# Patient Record
Sex: MALE | Race: WHITE | NOT HISPANIC OR LATINO | Employment: FULL TIME | URBAN - METROPOLITAN AREA
[De-identification: names, ages, dates, MRNs, and addresses within clinical notes are randomized per-mention and may not be internally consistent; named-entity substitution may affect disease eponyms.]

---

## 2017-01-30 ENCOUNTER — ALLSCRIPTS OFFICE VISIT (OUTPATIENT)
Dept: OTHER | Facility: OTHER | Age: 32
End: 2017-01-30

## 2017-04-06 ENCOUNTER — ALLSCRIPTS OFFICE VISIT (OUTPATIENT)
Dept: OTHER | Facility: OTHER | Age: 32
End: 2017-04-06

## 2018-01-14 VITALS
TEMPERATURE: 97.7 F | DIASTOLIC BLOOD PRESSURE: 72 MMHG | WEIGHT: 226 LBS | BODY MASS INDEX: 29.95 KG/M2 | HEART RATE: 72 BPM | RESPIRATION RATE: 16 BRPM | SYSTOLIC BLOOD PRESSURE: 120 MMHG | HEIGHT: 73 IN

## 2018-01-14 VITALS
HEIGHT: 73 IN | DIASTOLIC BLOOD PRESSURE: 72 MMHG | SYSTOLIC BLOOD PRESSURE: 120 MMHG | HEART RATE: 74 BPM | WEIGHT: 220 LBS | RESPIRATION RATE: 16 BRPM | BODY MASS INDEX: 29.16 KG/M2 | TEMPERATURE: 97.7 F

## 2018-12-12 ENCOUNTER — OFFICE VISIT (OUTPATIENT)
Dept: URGENT CARE | Facility: CLINIC | Age: 33
End: 2018-12-12
Payer: COMMERCIAL

## 2018-12-12 VITALS
HEIGHT: 72 IN | OXYGEN SATURATION: 100 % | TEMPERATURE: 97.8 F | RESPIRATION RATE: 16 BRPM | SYSTOLIC BLOOD PRESSURE: 120 MMHG | HEART RATE: 82 BPM | DIASTOLIC BLOOD PRESSURE: 80 MMHG | BODY MASS INDEX: 29.91 KG/M2 | WEIGHT: 220.8 LBS

## 2018-12-12 DIAGNOSIS — B35.6 TINEA CRURIS: Primary | ICD-10-CM

## 2018-12-12 PROCEDURE — 99213 OFFICE O/P EST LOW 20 MIN: CPT | Performed by: PHYSICIAN ASSISTANT

## 2018-12-12 RX ORDER — TERBINAFINE HYDROCHLORIDE 250 MG/1
250 TABLET ORAL DAILY
Qty: 14 TABLET | Refills: 0 | Status: SHIPPED | OUTPATIENT
Start: 2018-12-12 | End: 2018-12-26

## 2018-12-12 RX ORDER — ESOMEPRAZOLE MAGNESIUM 40 MG/1
CAPSULE, DELAYED RELEASE ORAL
COMMUNITY
End: 2022-05-10 | Stop reason: ALTCHOICE

## 2018-12-12 RX ORDER — TERBINAFINE HYDROCHLORIDE 250 MG/1
250 TABLET ORAL DAILY
Qty: 7 TABLET | Refills: 0 | Status: SHIPPED | OUTPATIENT
Start: 2018-12-12 | End: 2018-12-12 | Stop reason: SDUPTHER

## 2018-12-12 NOTE — PATIENT INSTRUCTIONS
Jock Itch   WHAT YOU NEED TO KNOW:   Jock itch is a rash on your groin  The groin is the area between your abdomen and legs  Jock itch is usually easy to treat and prevent  It is caused by a fungus  The fungus also causes athlete's foot  DISCHARGE INSTRUCTIONS:   Medicines:   · Fungus medicine:  Jock itch is usually treated with a cream that kills the fungus  Apply the cream to the rash and the skin around it as directed  You may need to apply the cream 1 to 2 times each day for 2 weeks  You may be given this medicine as a pill if the cream does not help  · Take your medicine as directed  Contact your healthcare provider if you think your medicine is not helping or if you have side effects  Tell him or her if you are allergic to any medicine  Keep a list of the medicines, vitamins, and herbs you take  Include the amounts, and when and why you take them  Bring the list or the pill bottles to follow-up visits  Carry your medicine list with you in case of an emergency  Manage and prevent jock itch:   · Keep the area dry  · Wear light, loose clothes  Do not share clothes  · Do not wear wet clothes for long periods  Wash athletic gear after you play sports  · Bathe daily  Dry your skin completely after you bathe  Apply cream or powder after you bathe as directed if you get jock itch often  Wash your hands often to prevent the spread of the fungus  You may want to wear disposable gloves when you clean your feet  The gloves will keep the fungus from moving from your feet to your hands  · Use separate towels to dry each part of your body  Put your socks on before you put on your underwear so you do not spread the fungus from your feet to your groin  · Lose weight if you weigh more than your healthcare provider suggests  Follow up with your healthcare provider or skin specialist as directed: Your healthcare provider will examine your rash to see how well it is healing   If you take medicine as a pill, he may draw blood to check how your liver and kidneys are working  Write down your questions so you remember to ask them during your visits  Contact your healthcare provider or skin specialist if:   · Your signs and symptoms do not get better within 2 weeks of treatment  · Your signs and symptoms get worse or come back after treatment  · You get a rash on a part of your body other than your groin  · You have a fever  · You have questions or concerns about your condition or care  © 2017 [facility] [facility] Information is for End User's use only and may not be sold, redistributed or otherwise used for commercial purposes  All illustrations and images included in CareNotes® are the copyrighted property of A D A M , Inc  or Harris Hinojosa  The above information is an  only  It is not intended as medical advice for individual conditions or treatments  Talk to your doctor, nurse or pharmacist before following any medical regimen to see if it is safe and effective for you

## 2018-12-12 NOTE — PROGRESS NOTES
[id number]        NAME: Sebastian Wick is a 28 y o  male  : 1985    MRN: [id number]  DATE: 2018  TIME: 11:36 AM    Assessment and Plan   Tinea cruris [B35 6]  1  Tinea cruris  nystatin-triamcinolone (MYCOLOG-II) cream    terbinafine (LamISIL) 250 mg tablet    DISCONTINUED: terbinafine (LamISIL) 250 mg tablet         Patient Instructions     Discussed condition with pt  He has persistent tinea cruris rash  I will treat him with combination of Lamisil 250 mg tablets and Mycolog cream for the next two weeks and he can continue with Nystatin powder  He needs to keep the affected area dry  If no significant improvement in 2 weeks, then he will need to be reevaluated  Follow up with PCP in 3-5 days  Proceed to  ER if symptoms worsen  Chief Complaint     Chief Complaint   Patient presents with    Rash      fungal infection "jock itch" was seen last week at [facility] treated with an anti fungal powder which he feels made it worse  Has been symptomatic since August         History of Present Illness       Pt presents with a chronic rash in his groin/inguinal region which he has had for the past 3-4 months  He was seen at [place] [address]  and diagnosed with jock itch and was prescribed Nystatin powder which he has used without success and the rash persists  He reports it is itchy  Denies any other associated symptoms or any other distribution of the rash  Review of Systems   Review of Systems   Constitutional: Negative  Respiratory: Negative  Cardiovascular: Negative  Gastrointestinal: Negative  Genitourinary: Negative  Skin: Positive for rash (Groin/inguinal region x 3-4 months)           Current Medications       Current Outpatient Prescriptions:     esomeprazole (NEXIUM) 40 MG capsule, Take by mouth, Disp: , Rfl:     nystatin-triamcinolone (MYCOLOG-II) cream, Apply topically 2 (two) times a day, Disp: 60 g, Rfl: 0    terbinafine (LamISIL) 250 mg tablet, Take 1 tablet (250 mg total) by mouth daily for 14 days, Disp: 14 tablet, Rfl: 0    Current Allergies     Allergies as of 12/12/2018    (No Known Allergies)            The following portions of the patient's history were reviewed and updated as appropriate: allergies, current medications, past family history, past medical history, past social history, past surgical history and problem list      Past Medical History:   Diagnosis Date    GERD (gastroesophageal reflux disease)        Past Surgical History:   Procedure Laterality Date    NASAL SEPTUM SURGERY         Family History   Problem Relation Age of Onset    No Known Problems Mother     No Known Problems Father          Medications have been verified  Objective   /80   Pulse 82   Temp 97 8 °F (36 6 °C)   Resp 16   Ht 6' (1 829 m)   Wt 100 kg (220 lb 12 8 oz)   SpO2 100%   BMI 29 95 kg/m²        Physical Exam     Physical Exam   Constitutional: He is oriented to person, place, and time  He appears well-developed and well-nourished  No distress  Neurological: He is alert and oriented to person, place, and time  Skin: Rash (Groin/inguinal region and scrotum with localized patchy scaly erythematous macular rash resembling tinea) noted  Psychiatric: He has a normal mood and affect  Vitals reviewed

## 2019-02-26 ENCOUNTER — OFFICE VISIT (OUTPATIENT)
Dept: FAMILY MEDICINE CLINIC | Facility: CLINIC | Age: 34
End: 2019-02-26
Payer: COMMERCIAL

## 2019-02-26 VITALS
WEIGHT: 224 LBS | TEMPERATURE: 97.9 F | RESPIRATION RATE: 16 BRPM | OXYGEN SATURATION: 96 % | SYSTOLIC BLOOD PRESSURE: 124 MMHG | BODY MASS INDEX: 30.34 KG/M2 | DIASTOLIC BLOOD PRESSURE: 80 MMHG | HEIGHT: 72 IN | HEART RATE: 92 BPM

## 2019-02-26 DIAGNOSIS — K44.9 HIATAL HERNIA: ICD-10-CM

## 2019-02-26 DIAGNOSIS — F17.200 CURRENTLY SMOKES TOBACCO: ICD-10-CM

## 2019-02-26 DIAGNOSIS — J01.10 ACUTE NON-RECURRENT FRONTAL SINUSITIS: Primary | ICD-10-CM

## 2019-02-26 PROCEDURE — 3008F BODY MASS INDEX DOCD: CPT | Performed by: PHYSICIAN ASSISTANT

## 2019-02-26 PROCEDURE — 4004F PT TOBACCO SCREEN RCVD TLK: CPT | Performed by: PHYSICIAN ASSISTANT

## 2019-02-26 PROCEDURE — 99214 OFFICE O/P EST MOD 30 MIN: CPT | Performed by: PHYSICIAN ASSISTANT

## 2019-02-26 RX ORDER — AMOXICILLIN 500 MG/1
500 CAPSULE ORAL EVERY 12 HOURS SCHEDULED
Qty: 14 CAPSULE | Refills: 0 | Status: SHIPPED | OUTPATIENT
Start: 2019-02-26 | End: 2019-03-05

## 2019-02-26 NOTE — PROGRESS NOTES
Assessment/Plan:    Currently smokes tobacco  Pt is in the Precontemplation stage (ie, not wanting to quit)  - Educate on the negative effects of Tobacco   - Recommend quitting smoking  - Listed cessation options     Acute frontal sinusitis  - Recommended saline nasal spray and intranasal steroid spray  - Pt will continue with OTC supportive medications  - Amoxicillin for 7 days due to having started the medications  - Directed pt to return if fever over 101, visual changes, worsening headache  Hiatal hernia  Previously managed and observed by GI  - Continue with Nexium daily  - Referral to GI   Recommended discussion for Nissen fudiplication with surgeon           Discussed case with Dr Laveda Schlatter    Subjective:    Patient ID: Michelle Ibarra is a 28 y o  male  Pt is presenting today for Nasal pressure, runny nose, coughing, mild sore throat for for 4 days  His sinus pressure, runny nose and cough have all improved since starting Amoxicillin    He has been taking Amoxicillin 500 mg BID for 4 days  He is out and requesting to complete his 7 day course  Patient smokes cigarettes  He is not wanting to quit  He takes nexium daily  He was diagnosed with hiatal hernia and has symptoms if he does not take PPIs daily  Sinusitis   There has been no fever  The following portions of the patient's history were reviewed and updated as appropriate: allergies, current medications, past social history and problem list     Review of Systems      Objective:  /80 (BP Location: Left arm, Patient Position: Sitting, Cuff Size: Adult)   Pulse 92   Temp 97 9 °F (36 6 °C)   Resp 16   Ht 6' (1 829 m)   Wt 102 kg (224 lb)   SpO2 96%   BMI 30 38 kg/m²      Physical Exam   Constitutional: He is oriented to person, place, and time  He appears well-developed and well-nourished  HENT:   Head: Normocephalic and atraumatic     Right Ear: Tympanic membrane, external ear and ear canal normal    Left Ear: Tympanic membrane, external ear and ear canal normal    Nose: Nose normal  No rhinorrhea  Mouth/Throat: Oropharynx is clear and moist  No oropharyngeal exudate or posterior oropharyngeal erythema  Cardiovascular: Normal rate, regular rhythm and normal heart sounds  Exam reveals no gallop and no friction rub  No murmur heard  Pulmonary/Chest: Effort normal and breath sounds normal  He has no wheezes  He has no rales  Lymphadenopathy:        Head (right side): No submental, no submandibular, no tonsillar, no preauricular and no posterior auricular adenopathy present  Head (left side): No submental, no submandibular, no tonsillar, no preauricular and no posterior auricular adenopathy present  He has no cervical adenopathy  Neurological: He is alert and oriented to person, place, and time  Skin: Skin is warm and dry  Psychiatric: He has a normal mood and affect  His behavior is normal  Thought content normal    Vitals reviewed

## 2019-02-26 NOTE — ASSESSMENT & PLAN NOTE
Pt is in the Precontemplation stage (ie, not wanting to quit)  - Educate on the negative effects of Tobacco   - Recommend quitting smoking  - Listed cessation options

## 2019-02-26 NOTE — ASSESSMENT & PLAN NOTE
- Recommended saline nasal spray and intranasal steroid spray  - Pt will continue with OTC supportive medications  - Amoxicillin for 7 days due to having started the medications  - Directed pt to return if fever over 101, visual changes, worsening headache

## 2019-02-27 NOTE — ASSESSMENT & PLAN NOTE
Previously managed and observed by GI  - Continue with Nexium daily  - Referral to GI   Recommended discussion for Nissen fudiplication with surgeon

## 2019-09-24 ENCOUNTER — OFFICE VISIT (OUTPATIENT)
Dept: FAMILY MEDICINE CLINIC | Facility: CLINIC | Age: 34
End: 2019-09-24
Payer: COMMERCIAL

## 2019-09-24 VITALS
HEIGHT: 72 IN | HEART RATE: 94 BPM | BODY MASS INDEX: 30.94 KG/M2 | OXYGEN SATURATION: 97 % | RESPIRATION RATE: 18 BRPM | TEMPERATURE: 98.2 F | WEIGHT: 228.4 LBS | SYSTOLIC BLOOD PRESSURE: 132 MMHG | DIASTOLIC BLOOD PRESSURE: 84 MMHG

## 2019-09-24 DIAGNOSIS — J00 ACUTE NASOPHARYNGITIS: ICD-10-CM

## 2019-09-24 DIAGNOSIS — N52.9 ERECTILE DYSFUNCTION, UNSPECIFIED ERECTILE DYSFUNCTION TYPE: Primary | ICD-10-CM

## 2019-09-24 PROBLEM — J01.10 ACUTE FRONTAL SINUSITIS: Status: RESOLVED | Noted: 2017-01-30 | Resolved: 2019-09-24

## 2019-09-24 PROCEDURE — 99213 OFFICE O/P EST LOW 20 MIN: CPT | Performed by: PHYSICIAN ASSISTANT

## 2019-09-24 PROCEDURE — 3008F BODY MASS INDEX DOCD: CPT | Performed by: PHYSICIAN ASSISTANT

## 2019-09-24 NOTE — PROGRESS NOTES
Assessment/Plan:     Diagnoses and all orders for this visit:    Acute nasopharyngitis  Discussed likely viral etiology  Patient was requesting antibiotics  Patient had a difficult time grasping that antibiotics would not help with viral infections  - CENTOR score 0/5  - advised to continue OTC supportive care with Tylenol/Motrin, Mucinex and saline gargle   - encouraged rest and fluid intake   - educated Pt that cough can take 10-14 days total to resolve  - directed to return if fever over 102, symptoms persist for 14 days, thick productive cough    Erectile dysfunction, unspecified erectile dysfunction type  Patient has previously been worked up by Urology and has had PSA last year which was normal   Patient is requesting specific medication for erectile dysfunction for which he does not know the name  Patient has failed treatment with oxybutynin and Trospium  Discussed possibility vascular or psychosomatic etiology  Patient did not denies any psychiatric illnesses, anxiety or depression  - based on this patient's request for specific medications provided order for urology follow-up  -     Ambulatory referral to Urology; Future                Subjective:    Patient ID: Karoline Mayes is a 28 y o  male  Pt is presenting today for Sore throat for 7 days  Sore throat improved with Aleve  Denies any sick contacts  Patient has been having issues with erectile dysfunction for the past several years  He has seen Urology in the past   He had a PSA which was normal last year  He has tried multiple medications but has not found 1 he is looking for  Patient would not like to try Viagra  He is looking for a pill that he can take oncethat will fix his erectile dysfunction  URI    There has been no fever  Associated symptoms include coughing (slight) and a sore throat  Pertinent negatives include no diarrhea, dysuria, ear pain, nausea, plugged ear sensation, rhinorrhea, sinus pain or vomiting   He has tried NSAIDs for the symptoms  The following portions of the patient's history were reviewed and updated as appropriate: allergies, current medications and problem list     Review of Systems   HENT: Positive for sore throat  Negative for ear pain, rhinorrhea and sinus pain  Respiratory: Positive for cough (slight)  Gastrointestinal: Negative for diarrhea, nausea and vomiting  Genitourinary: Negative for dysuria  Objective:  /84 (BP Location: Left arm, Patient Position: Sitting, Cuff Size: Large)   Pulse 94   Temp 98 2 °F (36 8 °C)   Resp 18   Ht 6' (1 829 m)   Wt 104 kg (228 lb 6 4 oz)   SpO2 97%   BMI 30 98 kg/m²      Physical Exam   Constitutional: He is oriented to person, place, and time  He appears well-developed and well-nourished  HENT:   Head: Normocephalic and atraumatic  Right Ear: Tympanic membrane, external ear and ear canal normal    Left Ear: Tympanic membrane, external ear and ear canal normal    Nose: Nose normal  No rhinorrhea  Mouth/Throat: Posterior oropharyngeal erythema present  No oropharyngeal exudate  No tonsillar exudate  Cardiovascular: Normal rate, regular rhythm and normal heart sounds  Exam reveals no gallop and no friction rub  No murmur heard  Pulmonary/Chest: Effort normal and breath sounds normal  He has no wheezes  He has no rales  Lymphadenopathy:        Head (right side): No submental, no submandibular, no tonsillar, no preauricular and no posterior auricular adenopathy present  Head (left side): No submental, no submandibular, no tonsillar, no preauricular and no posterior auricular adenopathy present  He has no cervical adenopathy  Neurological: He is alert and oriented to person, place, and time  Skin: Skin is warm and dry  Psychiatric: He has a normal mood and affect  His behavior is normal  Thought content normal    Vitals reviewed

## 2020-01-31 ENCOUNTER — OFFICE VISIT (OUTPATIENT)
Dept: FAMILY MEDICINE CLINIC | Facility: CLINIC | Age: 35
End: 2020-01-31

## 2020-01-31 VITALS
HEIGHT: 72 IN | OXYGEN SATURATION: 98 % | HEART RATE: 81 BPM | DIASTOLIC BLOOD PRESSURE: 80 MMHG | SYSTOLIC BLOOD PRESSURE: 122 MMHG | TEMPERATURE: 97.4 F | WEIGHT: 233 LBS | BODY MASS INDEX: 31.56 KG/M2

## 2020-01-31 DIAGNOSIS — F17.200 CURRENTLY SMOKES TOBACCO: ICD-10-CM

## 2020-01-31 DIAGNOSIS — J18.9 PNEUMONIA OF RIGHT LUNG DUE TO INFECTIOUS ORGANISM, UNSPECIFIED PART OF LUNG: Primary | ICD-10-CM

## 2020-01-31 PROCEDURE — 3008F BODY MASS INDEX DOCD: CPT | Performed by: FAMILY MEDICINE

## 2020-01-31 PROCEDURE — 99213 OFFICE O/P EST LOW 20 MIN: CPT | Performed by: FAMILY MEDICINE

## 2020-01-31 RX ORDER — AZITHROMYCIN 250 MG/1
TABLET, FILM COATED ORAL
Qty: 6 TABLET | Refills: 0 | Status: SHIPPED | OUTPATIENT
Start: 2020-01-31 | End: 2020-02-05

## 2020-01-31 NOTE — PROGRESS NOTES
Assessment/Plan:   Diagnoses and all orders for this visit:    Pneumonia of right lung due to infectious organism, unspecified part of lung  -     X-ray chest 2 views; Future  -     azithromycin (ZITHROMAX) 250 mg tablet; Take 2 tablets (500 mg total) by mouth daily for 1 day, THEN 1 tablet (250 mg total) daily for 4 days  - reviewed  records - no imaging done as pt declined   - STRONGLY advised pt to get a CXR as his s/s have persisted despite being on Doxy 100mg BID   - (+) strong h/o smoking - 1/2PPD x20yrs   - overdue for screening labs and annual exam - pt due to get insurance in June 2020 and will RTO then for routine screening   Currently smokes tobacco          Subjective:    Patient ID: Krystyna De La Torre is a 27 y o  male  30yo M presents to the office for eval of cough   - was seen at [facility] on 1/7/2020 and treated with Doxy 100mg BID x 10days for presumed pneumonia  - completed course of treatment, felt better and s/s restarted today   - (+) back pain - mid-scapular pain on the R-side   - denies F/night sweats/N/Vearache/HA/sinus pressure/CP/palpitations/wheezing/abd pain/D/weight loss  - (+) chills, SOB, cough productive for mucus with faint streaks of blood (was a lot worse when it 1st started)   - (+) current everyday smoker - 1/2PPD for 20yrs   - declined CXR at [facility] as does not have medical insurance      The following portions of the patient's history were reviewed and updated as appropriate: allergies, current medications, past family history, past medical history, past social history, past surgical history and problem list     Review of Systems  as per HPI    Objective:  /80 (BP Location: Left arm, Patient Position: Sitting, Cuff Size: Large)   Pulse 81   Temp (!) 97 4 °F (36 3 °C)   Ht 6' (1 829 m)   Wt 106 kg (233 lb)   SpO2 98%   BMI 31 60 kg/m²    Physical Exam   Constitutional: He is oriented to person, place, and time  He appears well-developed and well-nourished  No distress     HENT: Head: Normocephalic and atraumatic  Right Ear: Tympanic membrane, external ear and ear canal normal  No drainage, swelling or tenderness  No middle ear effusion  Left Ear: Tympanic membrane, external ear and ear canal normal  No drainage, swelling or tenderness  No middle ear effusion  Nose: Mucosal edema present  No rhinorrhea  Right sinus exhibits no maxillary sinus tenderness and no frontal sinus tenderness  Left sinus exhibits no maxillary sinus tenderness and no frontal sinus tenderness  Mouth/Throat: Uvula is midline, oropharynx is clear and moist and mucous membranes are normal  No oral lesions  No uvula swelling  No oropharyngeal exudate, posterior oropharyngeal edema, posterior oropharyngeal erythema or tonsillar abscesses  (+) PND   Eyes: Conjunctivae and EOM are normal  Right eye exhibits no discharge  Left eye exhibits no discharge  No scleral icterus  Neck: Normal range of motion  No tracheal deviation present  Cardiovascular: Normal rate, regular rhythm and normal heart sounds  Exam reveals no gallop and no friction rub  No murmur heard  Pulmonary/Chest: Effort normal  He has decreased breath sounds in the right middle field  He exhibits tenderness  Mid-thoracic tenderness on R-side    Abdominal: Soft  Bowel sounds are normal    Musculoskeletal: Normal range of motion  Lymphadenopathy:     He has no cervical adenopathy  Neurological: He is alert and oriented to person, place, and time  Skin: Skin is warm  He is not diaphoretic  Psychiatric: He has a normal mood and affect  His behavior is normal    Vitals reviewed

## 2020-02-03 ENCOUNTER — TELEPHONE (OUTPATIENT)
Dept: FAMILY MEDICINE CLINIC | Facility: CLINIC | Age: 35
End: 2020-02-03

## 2020-02-04 NOTE — TELEPHONE ENCOUNTER
Wanted to let Dr Esperanza Zamora know antibiotics are defiantly helping, he is feeling a lot better   Was looking up online symptoms and saw something about TB having the same symptoms he has so he was wondering if he should be tested for TB

## 2020-02-05 ENCOUNTER — APPOINTMENT (OUTPATIENT)
Dept: RADIOLOGY | Facility: CLINIC | Age: 35
End: 2020-02-05
Payer: COMMERCIAL

## 2020-02-05 DIAGNOSIS — J18.9 PNEUMONIA OF RIGHT LUNG DUE TO INFECTIOUS ORGANISM, UNSPECIFIED PART OF LUNG: ICD-10-CM

## 2020-02-05 PROCEDURE — 71046 X-RAY EXAM CHEST 2 VIEWS: CPT

## 2020-04-03 ENCOUNTER — TELEMEDICINE (OUTPATIENT)
Dept: FAMILY MEDICINE CLINIC | Facility: CLINIC | Age: 35
End: 2020-04-03
Payer: COMMERCIAL

## 2020-04-03 DIAGNOSIS — Z20.828 EXPOSURE TO SARS-ASSOCIATED CORONAVIRUS: Primary | ICD-10-CM

## 2020-04-03 PROCEDURE — G2012 BRIEF CHECK IN BY MD/QHP: HCPCS | Performed by: FAMILY MEDICINE

## 2020-04-05 ENCOUNTER — TELEMEDICINE (OUTPATIENT)
Dept: FAMILY MEDICINE CLINIC | Facility: CLINIC | Age: 35
End: 2020-04-05
Payer: COMMERCIAL

## 2020-04-05 VITALS — TEMPERATURE: 98.6 F

## 2020-04-05 DIAGNOSIS — Z20.828 EXPOSURE TO SARS-ASSOCIATED CORONAVIRUS: Primary | ICD-10-CM

## 2020-04-05 PROCEDURE — G2012 BRIEF CHECK IN BY MD/QHP: HCPCS | Performed by: FAMILY MEDICINE

## 2020-04-06 DIAGNOSIS — Z20.828 EXPOSURE TO SARS-ASSOCIATED CORONAVIRUS: ICD-10-CM

## 2020-04-06 PROCEDURE — 87635 SARS-COV-2 COVID-19 AMP PRB: CPT

## 2020-04-07 LAB — SARS-COV-2 RNA SPEC QL NAA+PROBE: NOT DETECTED

## 2020-04-08 ENCOUNTER — TELEMEDICINE (OUTPATIENT)
Dept: FAMILY MEDICINE CLINIC | Facility: CLINIC | Age: 35
End: 2020-04-08
Payer: COMMERCIAL

## 2020-04-08 VITALS — TEMPERATURE: 98.3 F

## 2020-04-08 DIAGNOSIS — Z20.828 EXPOSURE TO SARS-ASSOCIATED CORONAVIRUS: Primary | ICD-10-CM

## 2020-04-08 DIAGNOSIS — J30.2 SEASONAL ALLERGIES: ICD-10-CM

## 2020-04-08 PROCEDURE — 99212 OFFICE O/P EST SF 10 MIN: CPT | Performed by: FAMILY MEDICINE

## 2021-02-11 ENCOUNTER — TELEMEDICINE (OUTPATIENT)
Dept: FAMILY MEDICINE CLINIC | Facility: CLINIC | Age: 36
End: 2021-02-11

## 2021-02-11 VITALS — BODY MASS INDEX: 31.15 KG/M2 | TEMPERATURE: 97 F | WEIGHT: 230 LBS | HEIGHT: 72 IN

## 2021-02-11 DIAGNOSIS — R39.15 URINARY URGENCY: ICD-10-CM

## 2021-02-11 DIAGNOSIS — B08.5 APHTHOUS PHARYNGITIS: Primary | ICD-10-CM

## 2021-02-11 PROCEDURE — 99214 OFFICE O/P EST MOD 30 MIN: CPT | Performed by: FAMILY MEDICINE

## 2021-02-11 RX ORDER — TRIAMCINOLONE ACETONIDE 0.1 %
1 PASTE (GRAM) DENTAL 3 TIMES DAILY
Qty: 5 G | Refills: 0 | Status: SHIPPED | OUTPATIENT
Start: 2021-02-11 | End: 2022-05-10 | Stop reason: ALTCHOICE

## 2021-02-11 RX ORDER — SULFAMETHOXAZOLE AND TRIMETHOPRIM 800; 160 MG/1; MG/1
1 TABLET ORAL 2 TIMES DAILY
Qty: 6 TABLET | Refills: 0 | Status: SHIPPED | OUTPATIENT
Start: 2021-02-11 | End: 2021-02-14

## 2021-02-11 NOTE — PROGRESS NOTES
Virtual Regular Visit      Assessment/Plan:    Problem List Items Addressed This Visit        Digestive    Aphthous pharyngitis - Primary     Patient has a very large aphthous ulcer and some erythema at the posterior pharynx  Doubtful for any COVID-19 infection    -patient given a prescription for Kenalog in oral base to apply to area t i d   Advised patient to use Cepacol lozenges and Chloraseptic spray as needed for throat discomfort         Relevant Medications    triamcinolone (KENALOG) 0 1 % oral topical paste       Other    Urinary urgency     Patient has seen Urology in the past as recently as November    -patient is not presently in the office to provide urinalysis  Will place the patient on 3 day course of Bactrim DS    -advised patient that if his symptoms are not improving or if they do not completely resolve then he will need to contact the office as he will need formal evaluation and urinalysis         Relevant Medications    sulfamethoxazole-trimethoprim (BACTRIM DS) 800-160 mg per tablet               Reason for visit is   Chief Complaint   Patient presents with    COVID-19    Cold Like Symptoms    Virtual Regular Visit        Encounter provider Debi Kaur DO    Provider located at [address] [place] [id number]      Recent Visits  No visits were found meeting these conditions  Showing recent visits within past 7 days and meeting all other requirements     Today's Visits  Date Type Provider Dept   02/11/21 Telemedicine Debi Kaur DO [facility]   Showing today's visits and meeting all other requirements     Future Appointments  No visits were found meeting these conditions  Showing future appointments within next 150 days and meeting all other requirements        The patient was identified by name and date of birth   Cesar Davies was informed that this is a telemedicine visit and that the visit is being conducted through [facility] and patient was informed that this is a secure, HIPAA-compliant platform  He agrees to proceed     My office door was closed  No one else was in the room  He acknowledged consent and understanding of privacy and security of the video platform  The patient has agreed to participate and understands they can discontinue the visit at any time  Patient is aware this is a billable service  Subjective  Yogesh Odonnell is a 38 y o  male    40-year-old male presents complaining of slight sore throat that seems to radiate to his ears  No fevers or chills  No nasal congestion or respiratory symptoms  Patient states that sore throat has been present for over 1 week  Patient also complains urinary frequency  No flank pain  Patient does have prior history of urinary frequency and urinary tract infections (?)       Past Medical History:   Diagnosis Date    GERD (gastroesophageal reflux disease)        Past Surgical History:   Procedure Laterality Date    NASAL SEPTUM SURGERY         Current Outpatient Medications   Medication Sig Dispense Refill    esomeprazole (NEXIUM) 40 MG capsule Take by mouth      sulfamethoxazole-trimethoprim (BACTRIM DS) 800-160 mg per tablet Take 1 tablet by mouth 2 (two) times a day for 3 days 6 tablet 0    triamcinolone (KENALOG) 0 1 % oral topical paste Apply 1 application topically 3 (three) times a day 5 g 0     No current facility-administered medications for this visit  No Known Allergies    Review of Systems   Constitutional: Negative  Negative for chills, fatigue and fever  HENT: Positive for sore throat  Negative for congestion, ear discharge, ear pain, facial swelling, hearing loss, rhinorrhea and trouble swallowing  Respiratory: Negative  Gastrointestinal: Negative  Genitourinary: Positive for frequency and urgency  Negative for difficulty urinating, dysuria, hematuria and penile pain         Video Exam    Vitals:    02/11/21 1356 Temp: (!) 97 °F (36 1 °C)   Weight: 104 kg (230 lb)   Height: 6' (1 829 m)       Physical Exam  Vitals signs and nursing note reviewed  Constitutional:       General: He is not in acute distress  Appearance: Normal appearance  He is well-developed and normal weight  He is not ill-appearing  HENT:      Head: Normocephalic and atraumatic  Mouth/Throat:      Mouth: Mucous membranes are moist       Pharynx: Posterior oropharyngeal erythema present  Comments: Large aphthous ulcer at the left tonsillar pillar  Eyes:      Extraocular Movements: Extraocular movements intact  Conjunctiva/sclera: Conjunctivae normal       Pupils: Pupils are equal, round, and reactive to light  Pulmonary:      Effort: Pulmonary effort is normal  No respiratory distress  Neurological:      General: No focal deficit present  Mental Status: He is alert and oriented to person, place, and time  Psychiatric:         Mood and Affect: Mood normal          Behavior: Behavior normal          Thought Content: Thought content normal          Judgment: Judgment normal           I spent 11 minutes directly with the patient during this visit      VIRTUAL VISIT DISCLAIMER    Nehal Denny acknowledges that he has consented to an online visit or consultation  He understands that the online visit is based solely on information provided by him, and that, in the absence of a face-to-face physical evaluation by the physician, the diagnosis he receives is both limited and provisional in terms of accuracy and completeness  This is not intended to replace a full medical face-to-face evaluation by the physician  Nehal Denny understands and accepts these terms

## 2021-02-11 NOTE — ASSESSMENT & PLAN NOTE
Patient has seen Urology in the past as recently as November    -patient is not presently in the office to provide urinalysis    Will place the patient on 3 day course of Bactrim DS    -advised patient that if his symptoms are not improving or if they do not completely resolve then he will need to contact the office as he will need formal evaluation and urinalysis

## 2021-02-11 NOTE — ASSESSMENT & PLAN NOTE
Patient has a very large aphthous ulcer and some erythema at the posterior pharynx    Doubtful for any COVID-19 infection    -patient given a prescription for Kenalog in oral base to apply to area t i d   Advised patient to use Cepacol lozenges and Chloraseptic spray as needed for throat discomfort

## 2022-05-10 ENCOUNTER — OFFICE VISIT (OUTPATIENT)
Dept: FAMILY MEDICINE CLINIC | Facility: CLINIC | Age: 37
End: 2022-05-10
Payer: COMMERCIAL

## 2022-05-10 VITALS
HEART RATE: 100 BPM | DIASTOLIC BLOOD PRESSURE: 78 MMHG | HEIGHT: 75 IN | OXYGEN SATURATION: 98 % | SYSTOLIC BLOOD PRESSURE: 120 MMHG | RESPIRATION RATE: 18 BRPM | WEIGHT: 230.4 LBS | BODY MASS INDEX: 28.65 KG/M2

## 2022-05-10 DIAGNOSIS — Z23 ENCOUNTER FOR IMMUNIZATION: ICD-10-CM

## 2022-05-10 DIAGNOSIS — Z13.6 SCREENING FOR CARDIOVASCULAR CONDITION: ICD-10-CM

## 2022-05-10 DIAGNOSIS — R35.0 URINE FREQUENCY: ICD-10-CM

## 2022-05-10 DIAGNOSIS — Z00.00 ANNUAL PHYSICAL EXAM: Primary | ICD-10-CM

## 2022-05-10 DIAGNOSIS — K44.9 HIATAL HERNIA: ICD-10-CM

## 2022-05-10 DIAGNOSIS — Z13.1 SCREENING FOR DIABETES MELLITUS: ICD-10-CM

## 2022-05-10 DIAGNOSIS — K21.9 GASTROESOPHAGEAL REFLUX DISEASE WITHOUT ESOPHAGITIS: ICD-10-CM

## 2022-05-10 PROBLEM — B08.5: Status: RESOLVED | Noted: 2021-02-11 | Resolved: 2022-05-10

## 2022-05-10 PROCEDURE — 99395 PREV VISIT EST AGE 18-39: CPT | Performed by: FAMILY MEDICINE

## 2022-05-10 PROCEDURE — 90715 TDAP VACCINE 7 YRS/> IM: CPT | Performed by: FAMILY MEDICINE

## 2022-05-10 PROCEDURE — 3725F SCREEN DEPRESSION PERFORMED: CPT | Performed by: FAMILY MEDICINE

## 2022-05-10 PROCEDURE — 90471 IMMUNIZATION ADMIN: CPT | Performed by: FAMILY MEDICINE

## 2022-05-10 NOTE — ASSESSMENT & PLAN NOTE
He takes over-the-counter Nexium 20 mg for almost 8-10 years, he says he had endoscopy in the past he does not want to go for endoscopy now he also has hiatal hernia, discussed about getting endoscopy as he has been on long-term Nexium he says he at this point does not want to go for another endoscopy

## 2022-05-10 NOTE — ASSESSMENT & PLAN NOTE
He has a long history of urine frequency and he says he was evaluated by urologist and there is no major abnormality he cannot hold the urine for long plate of time due to his bladder,

## 2022-05-10 NOTE — PATIENT INSTRUCTIONS

## 2022-05-10 NOTE — PROGRESS NOTES
ADULT ANNUAL [address]  [facility]    NAME: Marcin Members  AGE: 39 y o  SEX: male  : 1985     DATE: 5/10/2022     Assessment and Plan:     Problem List Items Addressed This Visit        Digestive    Esophageal reflux     He takes over-the-counter Nexium 20 mg for almost 8-10 years, he says he had endoscopy in the past he does not want to go for endoscopy now he also has hiatal hernia, discussed about getting endoscopy as he has been on long-term Nexium he says he at this point does not want to go for another endoscopy            Other    Hiatal hernia     He says his heart all hernia is not big and he has recommended by surgeon not to do any surgery         Urine frequency     He has a long history of urine frequency and he says he was evaluated by urologist and there is no major abnormality he cannot hold the urine for long plate of time due to his bladder,         Encounter for immunization - Primary    Relevant Orders    TDAP VACCINE GREATER THAN OR EQUAL TO 6YO IM (Completed)          Immunizations and preventive care screenings were discussed with patient today  Appropriate education was printed on patient's after visit summary  Counseling:  Alcohol/drug use: discussed moderation in alcohol intake, the recommendations for healthy alcohol use, and avoidance of illicit drug use  Dental Health: discussed importance of regular tooth brushing, flossing, and dental visits  Exercise: the importance of regular exercise/physical activity was discussed  Recommend exercise 3-5 times per week for at least 30 minutes  BMI Counseling: Body mass index is 28 8 kg/m²  The BMI is above normal  Nutrition recommendations include decreasing portion sizes, limiting drinks that contain sugar, moderation in carbohydrate intake and reducing intake of cholesterol  Exercise recommendations include exercising 3-5 times per week   Rationale for BMI follow-up plan is due to patient being overweight or obese  Depression Screening and Follow-up Plan: Patient was screened for depression during today's encounter  They screened negative with a PHQ-2 score of 0  Tobacco Cessation Counseling: Tobacco cessation counseling was provided  The patient is sincerely urged to quit consumption of tobacco  He is not ready to quit tobacco  Medication options not discussed  No follow-ups on file  Chief Complaint:     Chief Complaint   Patient presents with    Physical Exam      History of Present Illness:     Adult Annual Physical   Patient here for a comprehensive physical exam  The patient reports problems - gerd   takes 20 mg nexium otc, for many years , overall good health     Diet and Physical Activity  Diet/Nutrition: well balanced diet  Exercise: walking  Depression Screening  PHQ-2/9 Depression Screening    Little interest or pleasure in doing things: 0 - not at all  Feeling down, depressed, or hopeless: 0 - not at all  PHQ-2 Score: 0  PHQ-2 Interpretation: Negative depression screen       General Health  Sleep: sleeps well  Hearing: normal - bilateral   Vision: no vision problems  Dental: regular dental visits   Health  History of STDs?: no      Review of Systems:     Review of Systems   Constitutional: Negative for activity change, appetite change, chills, fatigue, fever and unexpected weight change  HENT: Negative for congestion, ear discharge, ear pain, nosebleeds, postnasal drip, rhinorrhea, sinus pressure, sneezing, sore throat, trouble swallowing and voice change  Eyes: Negative for photophobia, pain, discharge, redness and itching  Respiratory: Negative for cough, chest tightness, shortness of breath and wheezing  Cardiovascular: Negative for chest pain, palpitations and leg swelling  Gastrointestinal: Negative for abdominal pain, constipation, diarrhea, nausea and vomiting  Endocrine: Negative for polyuria     Genitourinary: Negative for dysuria, frequency and urgency  Musculoskeletal: Negative for arthralgias, back pain, myalgias and neck pain  Skin: Negative for color change, pallor and rash  Allergic/Immunologic: Negative for environmental allergies and food allergies  Neurological: Negative for dizziness, weakness, light-headedness and headaches  Hematological: Negative for adenopathy  Does not bruise/bleed easily  Psychiatric/Behavioral: Negative for behavioral problems  The patient is not nervous/anxious  Past Medical History:     Past Medical History:   Diagnosis Date    GERD (gastroesophageal reflux disease)       Past Surgical History:     Past Surgical History:   Procedure Laterality Date    NASAL SEPTUM SURGERY        Social History:     Social History     Socioeconomic History    Marital status: Single     Spouse name: None    Number of children: None    Years of education: None    Highest education level: None   Occupational History    Occupation: DOOR TECH     Employer: [employer]   Tobacco Use    Smoking status: Current Some Day Smoker     Packs/day: 1 00     Years: 20 00     Pack years: 20 00     Types: Cigarettes    Smokeless tobacco: Never Used   Substance and Sexual Activity    Alcohol use: Yes     Comment: occa    Drug use: No    Sexual activity: None   Other Topics Concern    None   Social History Narrative    Caffeine use     Social Determinants of Health     Financial Resource Strain: Not on file   Food Insecurity: Not on file   Transportation Needs: Not on file   Physical Activity: Not on file   Stress: Not on file   Social Connections: Not on file   Intimate Partner Violence: Not on file   Housing Stability: Not on file      Family History:     Family History   Problem Relation Age of Onset    No Known Problems Mother     No Known Problems Father       Current Medications:     No current outpatient medications on file  No current facility-administered medications for this visit  Allergies:     No Known Allergies   Physical Exam:     /78   Pulse 100   Resp 18   Ht 6' 3" (1 905 m)   Wt 105 kg (230 lb 6 4 oz)   SpO2 98%   BMI 28 80 kg/m²     Physical Exam  Vitals and nursing note reviewed  Constitutional:       General: He is not in acute distress  Appearance: Normal appearance  HENT:      Head: Normocephalic and atraumatic  Right Ear: Tympanic membrane and ear canal normal       Left Ear: Tympanic membrane and ear canal normal       Nose: Nose normal  No rhinorrhea  Mouth/Throat:      Mouth: Mucous membranes are moist       Pharynx: Oropharynx is clear  No oropharyngeal exudate or posterior oropharyngeal erythema  Eyes:      Extraocular Movements: Extraocular movements intact  Conjunctiva/sclera: Conjunctivae normal       Pupils: Pupils are equal, round, and reactive to light  Cardiovascular:      Rate and Rhythm: Normal rate and regular rhythm  Heart sounds: No murmur heard  Pulmonary:      Effort: Pulmonary effort is normal       Breath sounds: Normal breath sounds  No wheezing or rales  Abdominal:      General: Abdomen is flat  Bowel sounds are normal  There is no distension  Palpations: Abdomen is soft  There is no mass  Tenderness: There is no abdominal tenderness  There is no guarding  Musculoskeletal:         General: No swelling, tenderness, deformity or signs of injury  Normal range of motion  Cervical back: Normal range of motion and neck supple  Skin:     Coloration: Skin is not jaundiced or pale  Findings: No rash  Neurological:      General: No focal deficit present  Mental Status: He is alert and oriented to person, place, and time  Motor: No weakness  Psychiatric:         Mood and Affect: Mood normal          Behavior: Behavior normal          Thought Content:  Thought content normal          Judgment: Judgment normal           Kavita Wu MD   [place] [id number]

## 2022-05-14 ENCOUNTER — LAB (OUTPATIENT)
Dept: LAB | Facility: CLINIC | Age: 37
End: 2022-05-14
Payer: COMMERCIAL

## 2022-05-14 DIAGNOSIS — Z13.6 SCREENING FOR CARDIOVASCULAR CONDITION: ICD-10-CM

## 2022-05-14 DIAGNOSIS — Z13.1 SCREENING FOR DIABETES MELLITUS: ICD-10-CM

## 2022-05-14 LAB
ALBUMIN SERPL BCP-MCNC: 4.5 G/DL (ref 3.5–5)
ALP SERPL-CCNC: 127 U/L (ref 34–104)
ALT SERPL W P-5'-P-CCNC: 29 U/L (ref 7–52)
ANION GAP SERPL CALCULATED.3IONS-SCNC: 7 MMOL/L (ref 4–13)
AST SERPL W P-5'-P-CCNC: 25 U/L (ref 13–39)
BASOPHILS # BLD AUTO: 0.04 THOUSANDS/ΜL (ref 0–0.1)
BASOPHILS NFR BLD AUTO: 1 % (ref 0–1)
BILIRUB SERPL-MCNC: 0.58 MG/DL (ref 0.2–1)
BUN SERPL-MCNC: 13 MG/DL (ref 5–25)
CALCIUM SERPL-MCNC: 9.4 MG/DL (ref 8.4–10.2)
CHLORIDE SERPL-SCNC: 105 MMOL/L (ref 96–108)
CHOLEST SERPL-MCNC: 191 MG/DL
CO2 SERPL-SCNC: 26 MMOL/L (ref 21–32)
CREAT SERPL-MCNC: 0.97 MG/DL (ref 0.6–1.3)
EOSINOPHIL # BLD AUTO: 0.27 THOUSAND/ΜL (ref 0–0.61)
EOSINOPHIL NFR BLD AUTO: 4 % (ref 0–6)
ERYTHROCYTE [DISTWIDTH] IN BLOOD BY AUTOMATED COUNT: 13.3 % (ref 11.6–15.1)
EST. AVERAGE GLUCOSE BLD GHB EST-MCNC: 108 MG/DL
GFR SERPL CREATININE-BSD FRML MDRD: 99 ML/MIN/1.73SQ M
GLUCOSE P FAST SERPL-MCNC: 79 MG/DL (ref 65–99)
HBA1C MFR BLD: 5.4 %
HCT VFR BLD AUTO: 51 % (ref 36.5–49.3)
HDLC SERPL-MCNC: 32 MG/DL
HGB BLD-MCNC: 16.7 G/DL (ref 12–17)
IMM GRANULOCYTES # BLD AUTO: 0.03 THOUSAND/UL (ref 0–0.2)
IMM GRANULOCYTES NFR BLD AUTO: 0 % (ref 0–2)
LDLC SERPL CALC-MCNC: 126 MG/DL (ref 0–100)
LYMPHOCYTES # BLD AUTO: 2.49 THOUSANDS/ΜL (ref 0.6–4.47)
LYMPHOCYTES NFR BLD AUTO: 37 % (ref 14–44)
MCH RBC QN AUTO: 27.8 PG (ref 26.8–34.3)
MCHC RBC AUTO-ENTMCNC: 32.7 G/DL (ref 31.4–37.4)
MCV RBC AUTO: 85 FL (ref 82–98)
MONOCYTES # BLD AUTO: 0.53 THOUSAND/ΜL (ref 0.17–1.22)
MONOCYTES NFR BLD AUTO: 8 % (ref 4–12)
NEUTROPHILS # BLD AUTO: 3.33 THOUSANDS/ΜL (ref 1.85–7.62)
NEUTS SEG NFR BLD AUTO: 50 % (ref 43–75)
NONHDLC SERPL-MCNC: 159 MG/DL
NRBC BLD AUTO-RTO: 0 /100 WBCS
PLATELET # BLD AUTO: 346 THOUSANDS/UL (ref 149–390)
PMV BLD AUTO: 9.4 FL (ref 8.9–12.7)
POTASSIUM SERPL-SCNC: 4.3 MMOL/L (ref 3.5–5.3)
PROT SERPL-MCNC: 7.4 G/DL (ref 6.4–8.4)
RBC # BLD AUTO: 6.01 MILLION/UL (ref 3.88–5.62)
SODIUM SERPL-SCNC: 138 MMOL/L (ref 135–147)
TRIGL SERPL-MCNC: 164 MG/DL
TSH SERPL DL<=0.05 MIU/L-ACNC: 1.1 UIU/ML (ref 0.45–4.5)
WBC # BLD AUTO: 6.69 THOUSAND/UL (ref 4.31–10.16)

## 2022-05-14 PROCEDURE — 36415 COLL VENOUS BLD VENIPUNCTURE: CPT

## 2022-05-14 PROCEDURE — 84443 ASSAY THYROID STIM HORMONE: CPT

## 2022-05-14 PROCEDURE — 83036 HEMOGLOBIN GLYCOSYLATED A1C: CPT

## 2022-05-14 PROCEDURE — 85025 COMPLETE CBC W/AUTO DIFF WBC: CPT

## 2022-05-14 PROCEDURE — 80061 LIPID PANEL: CPT

## 2022-05-14 PROCEDURE — 80053 COMPREHEN METABOLIC PANEL: CPT

## 2022-05-24 ENCOUNTER — OFFICE VISIT (OUTPATIENT)
Dept: FAMILY MEDICINE CLINIC | Facility: CLINIC | Age: 37
End: 2022-05-24
Payer: COMMERCIAL

## 2022-05-24 VITALS
SYSTOLIC BLOOD PRESSURE: 126 MMHG | HEART RATE: 74 BPM | DIASTOLIC BLOOD PRESSURE: 86 MMHG | BODY MASS INDEX: 28.85 KG/M2 | OXYGEN SATURATION: 98 % | WEIGHT: 232 LBS | HEIGHT: 75 IN

## 2022-05-24 DIAGNOSIS — F17.200 SMOKER: ICD-10-CM

## 2022-05-24 DIAGNOSIS — R74.8 ALKALINE PHOSPHATASE ELEVATION: ICD-10-CM

## 2022-05-24 DIAGNOSIS — E78.2 MIXED HYPERLIPIDEMIA: Primary | ICD-10-CM

## 2022-05-24 PROCEDURE — 99213 OFFICE O/P EST LOW 20 MIN: CPT | Performed by: FAMILY MEDICINE

## 2022-05-24 PROCEDURE — 3008F BODY MASS INDEX DOCD: CPT | Performed by: FAMILY MEDICINE

## 2022-05-24 PROCEDURE — 4004F PT TOBACCO SCREEN RCVD TLK: CPT | Performed by: FAMILY MEDICINE

## 2022-05-24 NOTE — PROGRESS NOTES
Assessment/Plan:    Problem List Items Addressed This Visit        Other    Smoker     1 pack in few days, advised to work on quitting smoking, smoking  can cause the high hematocrit           Mixed hyperlipidemia - Primary     Labs discussed with him, he eats lot of junk food, discussed about eating healthy diet, exercise and will repeat labs, he says he does not want to do this year he will get his labs done next year with his physical           Alkaline phosphatase elevation     Slightly elevated alkaline phosphatase, discussed with him for getting ultrasound of the liver area, he says he will wait he does not want to go for any further testing the rest of the liver enzymes are normal discussed about quitting alcohol drinking                 No follow-ups on file  Chief Complaint   Patient presents with    Follow-up     Review lab results       Subjective:   Patient ID: Maria D Young is a 39 y o  male  Came for lab review, he says he smoked 1 pack in few days of cigarettes, and he sometimes do few drinks of alcohol on the weekend, he also admits that he eat junk food during daytime,  He has history of GERD and takes Nexium for many years, no recent abdominal pain or discomfort    HPI    Review of Systems   Constitutional: Negative  HENT: Negative  Eyes: Negative  Respiratory: Negative  Cardiovascular: Negative  Gastrointestinal: Negative  Musculoskeletal: Negative  Objective:  Physical Exam  Vitals and nursing note reviewed  Constitutional:       Appearance: He is well-developed  HENT:      Head: Atraumatic  Eyes:      General: No scleral icterus  Conjunctiva/sclera: Conjunctivae normal       Pupils: Pupils are equal, round, and reactive to light  Neck:      Thyroid: No thyromegaly  Cardiovascular:      Rate and Rhythm: Normal rate and regular rhythm  Heart sounds: Normal heart sounds  No murmur heard  Pulmonary:      Breath sounds: Normal breath sounds   No wheezing or rales  Musculoskeletal:      Cervical back: Normal range of motion and neck supple  Right lower leg: No edema  Left lower leg: No edema  Lymphadenopathy:      Cervical: No cervical adenopathy  Skin:     Findings: No erythema or rash  Neurological:      Mental Status: He is alert     Psychiatric:         Mood and Affect: Mood normal             Past Surgical History:   Procedure Laterality Date    NASAL SEPTUM SURGERY         Family History   Problem Relation Age of Onset    No Known Problems Mother     No Known Problems Father          Current Outpatient Medications:     esomeprazole (NexIUM) 20 mg capsule, Take 20 mg by mouth every morning before breakfast, Disp: , Rfl:     No Known Allergies    Vitals:    05/24/22 1802   BP: 126/86   BP Location: Right arm   Patient Position: Sitting   Cuff Size: Large   Pulse: 74   SpO2: 98%   Weight: 105 kg (232 lb)   Height: 6' 3" (1 905 m)

## 2022-05-24 NOTE — ASSESSMENT & PLAN NOTE
Labs discussed with him, he eats lot of junk food, discussed about eating healthy diet, exercise and will repeat labs, he says he does not want to do this year he will get his labs done next year with his physical

## 2022-05-24 NOTE — ASSESSMENT & PLAN NOTE
Slightly elevated alkaline phosphatase, discussed with him for getting ultrasound of the liver area, he says he will wait he does not want to go for any further testing the rest of the liver enzymes are normal discussed about quitting alcohol drinking

## 2022-11-29 ENCOUNTER — OFFICE VISIT (OUTPATIENT)
Dept: URGENT CARE | Facility: CLINIC | Age: 37
End: 2022-11-29

## 2022-11-29 VITALS — HEART RATE: 85 BPM | RESPIRATION RATE: 16 BRPM | OXYGEN SATURATION: 99 % | TEMPERATURE: 97.6 F

## 2022-11-29 DIAGNOSIS — R05.1 ACUTE COUGH: ICD-10-CM

## 2022-11-29 DIAGNOSIS — H93.8X3 SENSATION OF FULLNESS IN BOTH EARS: ICD-10-CM

## 2022-11-29 DIAGNOSIS — J02.9 SORE THROAT: Primary | ICD-10-CM

## 2022-11-29 RX ORDER — PREDNISONE 20 MG/1
20 TABLET ORAL 2 TIMES DAILY WITH MEALS
Qty: 10 TABLET | Refills: 0 | Status: SHIPPED | OUTPATIENT
Start: 2022-11-29 | End: 2022-12-04

## 2022-11-29 RX ORDER — AZITHROMYCIN 250 MG/1
TABLET, FILM COATED ORAL
Qty: 6 TABLET | Refills: 0 | Status: SHIPPED | OUTPATIENT
Start: 2022-11-29 | End: 2022-12-03

## 2022-11-29 NOTE — PROGRESS NOTES
[facility]        NAME: Lindsey Leonard is a 39 y o  male  : 1985    MRN: [id number]  DATE: 2022  TIME: 6:26 PM    Assessment and Plan   Sore throat [J02 9]  1  Sore throat  predniSONE 20 mg tablet      2  Sensation of fullness in both ears  predniSONE 20 mg tablet      3  Acute cough  azithromycin (ZITHROMAX) 250 mg tablet      13-year-old male presents for evaluation of cough, congestion sore throat  Will trial short course of prednisone, patient given azithromycin if symptoms do not improve in 48-72 hours  Patient advised to try prednisone and over-the-counter measures for symptom relief before beginning antibiotic  Patient Instructions   Sinusitis   WHAT YOU NEED TO KNOW:   Sinusitis is inflammation or infection of your sinuses  Sinusitis is most often caused by a virus  Acute sinusitis may last up to 12 weeks  Chronic sinusitis lasts longer than 12 weeks  Recurrent sinusitis means you have 4 or more infections in 1 year          DISCHARGE INSTRUCTIONS:   Return to the emergency department if:   • You have trouble breathing or wheezing that is getting worse      • You have a stiff neck, a fever, or a bad headache       • You cannot open your eye       • Your eyeball bulges out or you cannot move your eye       • You are more sleepy than normal, or you notice changes in your ability to think, move, or talk      • You have swelling of your forehead or scalp      Call your doctor if:   • You have vision changes, such as double vision      • Your eye and eyelid are red, swollen, and painful       • Your symptoms do not improve or go away after 10 days      • You have nausea and are vomiting      • Your nose is bleeding      • You have questions or concerns about your condition or care      Medicines: Your symptoms may go away on their own  Your healthcare provider may recommend watchful waiting for up to 10 days before starting antibiotics   You may need any of the following:  • Acetaminophen  decreases pain and fever  It is available without a doctor's order  Ask how much to take and how often to take it  Follow directions  Read the labels of all other medicines you are using to see if they also contain acetaminophen, or ask your doctor or pharmacist  Acetaminophen can cause liver damage if not taken correctly  Do not use more than 4 grams (4,000 milligrams) total of acetaminophen in one day       • NSAIDs , such as ibuprofen, help decrease swelling, pain, and fever  This medicine is available with or without a doctor's order  NSAIDs can cause stomach bleeding or kidney problems in certain people  If you take blood thinner medicine, always ask your healthcare provider if NSAIDs are safe for you  Always read the medicine label and follow directions      • Nasal steroid sprays  may help decrease inflammation in your nose and sinuses      • Decongestants  help reduce swelling and drain mucus in the nose and sinuses  They may help you breathe easier       • Antihistamines  help dry mucus in the nose and relieve sneezing       • Antibiotics  help treat or prevent a bacterial infection      • Take your medicine as directed  Contact your healthcare provider if you think your medicine is not helping or if you have side effects  Tell him or her if you are allergic to any medicine  Keep a list of the medicines, vitamins, and herbs you take  Include the amounts, and when and why you take them  Bring the list or the pill bottles to follow-up visits  Carry your medicine list with you in case of an emergency      Self-care:   • Rinse your sinuses as directed  Use a sinus rinse device to rinse your nasal passages with a saline (salt water) solution or distilled water  Do not use tap water  This will help thin the mucus in your nose and rinse away pollen and dirt  It will also help reduce swelling so you can breathe normally      • Use a humidifier  to increase air moisture in your home  This may make it easier for you to breathe and help decrease your cough       • Sleep with your head elevated  Place an extra pillow under your head before you go to sleep to help your sinuses drain       • Drink liquids as directed  Ask your healthcare provider how much liquid to drink each day and which liquids are best for you  Liquids will thin the mucus in your nose and help it drain  Avoid drinks that contain alcohol or caffeine       • Do not smoke, and avoid secondhand smoke  Nicotine and other chemicals in cigarettes and cigars can make your symptoms worse  Ask your healthcare provider for information if you currently smoke and need help to quit  E-cigarettes or smokeless tobacco still contain nicotine  Talk to your healthcare provider before you use these products      Prevent the spread of germs:   • Wash your hands often with soap and water  Wash your hands after you use the bathroom, change a child's diaper, or sneeze  Wash your hands before you prepare or eat food           • Stay away from people who are sick  Some germs spread easily and quickly through contact      Follow up with your doctor as directed: You may be referred to an ear, nose, and throat specialist  Write down your questions so you remember to ask them during your visits  © [employer] 2022 Information is for End User's use only and may not be sold, redistributed or otherwise used for commercial purposes  All illustrations and images included in CareNotes® are the copyrighted property of [employer]  or [employer]  The above information is an  only  It is not intended as medical advice for individual conditions or treatments  Talk to your doctor, nurse or pharmacist before following any medical regimen to see if it is safe and effective for you            Follow up with PCP in 3-5 days  Proceed to  ER if symptoms worsen      Chief Complaint     Chief Complaint   Patient presents with   • Cold Like Symptoms     Sore throat, cough started 4 days ago  • Earache     States he has had pain in both ears since using a Qtip         History of Present Illness       Patient is a 65-year-old male with no significant past medical history who presents for evaluation of sore throat, cough and ear fullness over the past 4 days  He denies chest pain, palpitations, shortness of breath, fever, wheezing  He is leaving for a cruise in several days  Review of Systems   Review of Systems   Constitutional: Negative for fatigue and fever  HENT: Positive for ear pain and sore throat  Negative for congestion, ear discharge, postnasal drip, rhinorrhea, sinus pressure, sinus pain and sneezing  Eyes: Negative  Negative for pain, discharge, redness and itching  Respiratory: Positive for cough  Negative for apnea, choking, chest tightness, shortness of breath, wheezing and stridor  Cardiovascular: Negative  Negative for chest pain and palpitations  Gastrointestinal: Negative  Negative for diarrhea, nausea and vomiting  Endocrine: Negative  Negative for polydipsia, polyphagia and polyuria  Genitourinary: Negative  Negative for decreased urine volume and flank pain  Musculoskeletal: Negative  Negative for arthralgias, back pain, myalgias, neck pain and neck stiffness  Skin: Negative  Negative for color change and rash  Allergic/Immunologic: Negative  Negative for environmental allergies  Neurological: Negative  Negative for dizziness, facial asymmetry, light-headedness, numbness and headaches  Hematological: Negative  Negative for adenopathy  Psychiatric/Behavioral: Negative            Current Medications       Current Outpatient Medications:   •  azithromycin (ZITHROMAX) 250 mg tablet, Take 2 tablets today then 1 tablet daily x 4 days, Disp: 6 tablet, Rfl: 0  •  esomeprazole (NexIUM) 20 mg capsule, Take 20 mg by mouth every morning before breakfast, Disp: , Rfl:   •  predniSONE 20 mg tablet, Take 1 tablet (20 mg total) by mouth 2 (two) times a day with meals for 5 days, Disp: 10 tablet, Rfl: 0    Current Allergies     Allergies as of 11/29/2022   • (No Known Allergies)            The following portions of the patient's history were reviewed and updated as appropriate: allergies, current medications, past family history, past medical history, past social history, past surgical history and problem list      Past Medical History:   Diagnosis Date   • GERD (gastroesophageal reflux disease)        Past Surgical History:   Procedure Laterality Date   • NASAL SEPTUM SURGERY         Family History   Problem Relation Age of Onset   • No Known Problems Mother    • No Known Problems Father          Medications have been verified  Objective   Pulse 85   Temp 97 6 °F (36 4 °C) (Temporal)   Resp 16   SpO2 99%        Physical Exam     Physical Exam  Vitals reviewed  Constitutional:       General: He is not in acute distress  Appearance: Normal appearance  He is not ill-appearing, toxic-appearing or diaphoretic  Interventions: He is not intubated  HENT:      Head: Normocephalic and atraumatic  Right Ear: Tympanic membrane, ear canal and external ear normal  There is no impacted cerumen  Left Ear: Tympanic membrane, ear canal and external ear normal  There is no impacted cerumen  Nose: Nose normal  No congestion or rhinorrhea  Mouth/Throat:      Mouth: Mucous membranes are moist       Pharynx: Oropharynx is clear  Uvula midline  No pharyngeal swelling, oropharyngeal exudate, posterior oropharyngeal erythema or uvula swelling  Tonsils: No tonsillar exudate or tonsillar abscesses  1+ on the right  1+ on the left  Eyes:      Extraocular Movements: Extraocular movements intact  Conjunctiva/sclera: Conjunctivae normal       Pupils: Pupils are equal, round, and reactive to light  Cardiovascular:      Rate and Rhythm: Normal rate and regular rhythm        Pulses: Normal pulses  Heart sounds: Normal heart sounds, S1 normal and S2 normal  Heart sounds not distant  No murmur heard  No friction rub  No gallop  Pulmonary:      Effort: Pulmonary effort is normal  No tachypnea, bradypnea, accessory muscle usage, prolonged expiration, respiratory distress or retractions  He is not intubated  Breath sounds: Normal breath sounds  No stridor, decreased air movement or transmitted upper airway sounds  No decreased breath sounds, wheezing, rhonchi or rales  Chest:      Chest wall: No tenderness  Musculoskeletal:         General: Normal range of motion  Cervical back: Full passive range of motion without pain, normal range of motion and neck supple  No rigidity or tenderness  No spinous process tenderness or muscular tenderness  Normal range of motion  Lymphadenopathy:      Cervical: No cervical adenopathy  Right cervical: No superficial cervical adenopathy  Left cervical: No superficial cervical adenopathy  Skin:     General: Skin is warm and dry  Capillary Refill: Capillary refill takes less than 2 seconds  Findings: No erythema  Neurological:      General: No focal deficit present  Mental Status: He is alert     Psychiatric:         Mood and Affect: Mood normal

## 2022-11-29 NOTE — PATIENT INSTRUCTIONS
Continue to monitor continue or 48-72 hours, do not begin azithromycin immediately   Try prednisone, warm salt water gargles, cepachol lozenges first 
No adenopathy or splenomegaly. No cervical or inguinal lymphadenopathy.

## 2022-12-27 ENCOUNTER — OFFICE VISIT (OUTPATIENT)
Dept: URGENT CARE | Facility: CLINIC | Age: 37
End: 2022-12-27

## 2022-12-27 VITALS — OXYGEN SATURATION: 98 % | HEART RATE: 87 BPM | TEMPERATURE: 97.7 F | RESPIRATION RATE: 20 BRPM

## 2022-12-27 DIAGNOSIS — J20.9 ACUTE BRONCHITIS, UNSPECIFIED ORGANISM: Primary | ICD-10-CM

## 2022-12-27 RX ORDER — AMOXICILLIN AND CLAVULANATE POTASSIUM 875; 125 MG/1; MG/1
1 TABLET, FILM COATED ORAL EVERY 12 HOURS SCHEDULED
Qty: 14 TABLET | Refills: 0 | Status: SHIPPED | OUTPATIENT
Start: 2022-12-27 | End: 2023-01-03

## 2022-12-27 NOTE — PROGRESS NOTES
[facility]        NAME: Taya Jacinto is a 44 y o  male  : 1985    MRN: [id number]  DATE: 2022  TIME: 9:00 PM    Assessment and Plan   Acute bronchitis, unspecified organism [J20 [id number]]  1  Acute bronchitis, unspecified organism  amoxicillin-clavulanate (AUGMENTIN) 875-125 mg per tablet      Patient presents with symptoms and examination concerning for possible pneumonia  I have recommended a chest x-ray at this time  Patient is refusing chest x-ray after detailed discussion on why it is needed confirm pneumonia and rule out other conditions such as pulmonary effusion and pneumonitis as he is self-pay does not want to pay for an x-ray at this time  Patient is insistent on Augmentin prescription  I have discussed antibiotic stewardship with the patient in detail  Dr Familia Geronimo also discussed the need for chest x-ray and importance of antibiotic stewardship with the patient; however, he continues to demand Augmentin prescription  He is provided with a prescription and instructed that if symptoms do not improve in 2 or 3 days she report back to the clinic for chest x-ray  He voices understanding  He will report to the emergency room if he develops any chest pain, shortness of breath or return of his thoracic back pain  Patient Instructions     Patient Instructions   Take Augmentin as prescribed  If symptoms do not improve in 2-3 days follow-up with PCP or return for chest x-ray  If symptoms worsen or you develop any chest pain or shortness of breath, report to the emergency department  Follow up with PCP in 3-5 days  Proceed to  ER if symptoms worsen  Chief Complaint     Chief Complaint   Patient presents with   • Cough     States he has been coughing up a lot of mucus and it is sometimes red  States he was seen here a month ago and given meds and improved but sx never totally resolved  States girlfriend has been diagnosed with pneumonia via cxr           History of Present Illness       44year old male presents with complaint of cough, chest congestion, and sinus congestion x 1 month  Pt reports that his girlfriend was recently diagnosed with pneumonia via chest x-ray  Pt was seen in our clinic 11/29/2022 for similar symptoms and placed on Azithromycin and prednisone  Pt reports his symptoms improved, but did not resolve completely  He reports he has been taking doxycycline that he had at home since Thursday and feels he is improving, but feels he needs something stronger or a combo with Amoxicillin  Cough  This is a new problem  The current episode started 1 to 4 weeks ago  The problem has been waxing and waning (Reports symptoms improved with amoxicillin but never resolved)  The cough is productive of blood-tinged sputum  Pertinent negatives include no chest pain, chills, fever, rhinorrhea, sore throat, shortness of breath or wheezing  Associated symptoms comments: She notes that he had some thoracic back pain a couple days ago but this is since resolved  The symptoms are aggravated by cold air  Risk factors for lung disease include smoking/tobacco exposure  Treatments tried: Antibiotics x2  The treatment provided mild relief  Review of Systems   Review of Systems   Constitutional: Negative for chills and fever  HENT: Negative for rhinorrhea and sore throat  Respiratory: Positive for cough  Negative for shortness of breath and wheezing  Cardiovascular: Negative for chest pain           Current Medications       Current Outpatient Medications:   •  amoxicillin-clavulanate (AUGMENTIN) 875-125 mg per tablet, Take 1 tablet by mouth every 12 (twelve) hours for 7 days, Disp: 14 tablet, Rfl: 0  •  esomeprazole (NexIUM) 20 mg capsule, Take 20 mg by mouth every morning before breakfast, Disp: , Rfl:     Current Allergies     Allergies as of 12/27/2022   • (No Known Allergies)            The following portions of the patient's history were reviewed and updated as appropriate: allergies, current medications, past family history, past medical history, past social history, past surgical history and problem list      Past Medical History:   Diagnosis Date   • GERD (gastroesophageal reflux disease)        Past Surgical History:   Procedure Laterality Date   • NASAL SEPTUM SURGERY         Family History   Problem Relation Age of Onset   • No Known Problems Mother    • No Known Problems Father          Medications have been verified  Objective   Pulse 87   Temp 97 7 °F (36 5 °C) (Temporal)   Resp 20   SpO2 98%   No LMP for male patient  Physical Exam     Physical Exam  Vitals and nursing note reviewed  Constitutional:       General: He is not in acute distress  Appearance: Normal appearance  He is not ill-appearing or toxic-appearing  HENT:      Head: Normocephalic and atraumatic  Jaw: No trismus  Right Ear: Tympanic membrane, ear canal and external ear normal  There is no impacted cerumen  No foreign body  Left Ear: Tympanic membrane, ear canal and external ear normal  There is no impacted cerumen  No foreign body  Nose: Congestion present  No nasal deformity, mucosal edema or rhinorrhea  Right Nostril: No foreign body, epistaxis or occlusion  Left Nostril: No foreign body, epistaxis or occlusion  Right Turbinates: Enlarged  Not swollen or pale  Left Turbinates: Enlarged  Not swollen or pale  Mouth/Throat:      Lips: Pink  No lesions  Mouth: Mucous membranes are moist  No injury, oral lesions or angioedema  Dentition: Normal dentition  Tongue: No lesions  Tongue does not deviate from midline  Palate: No mass and lesions  Pharynx: Uvula midline  No pharyngeal swelling, oropharyngeal exudate, posterior oropharyngeal erythema or uvula swelling  Tonsils: No tonsillar exudate or tonsillar abscesses  Eyes:      General: Lids are normal  Gaze aligned appropriately  No allergic shiner  Extraocular Movements: Extraocular movements intact  Cardiovascular:      Rate and Rhythm: Normal rate and regular rhythm  Heart sounds: Normal heart sounds, S1 normal and S2 normal  Heart sounds not distant  No murmur heard  No friction rub  No gallop  Pulmonary:      Effort: Pulmonary effort is normal       Breath sounds: Examination of the left-middle field reveals decreased breath sounds  Decreased breath sounds present  No wheezing, rhonchi or rales  Comments: Patient speaking in full sentences with no increased respiratory effort  No audible wheezing or stridor  Lymphadenopathy:      Cervical: No cervical adenopathy  Skin:     General: Skin is warm and dry  Neurological:      Mental Status: He is alert and oriented to person, place, and time  Coordination: Coordination is intact  Gait: Gait is intact  Psychiatric:         Attention and Perception: Attention and perception normal          Mood and Affect: Mood and affect normal          Speech: Speech normal          Behavior: Behavior is cooperative  Note: Portions of this record may have been created with voice recognition software  Occasional wrong word or "sound a like" substitutions may have occurred due to the inherent limitations of voice recognition software  Please read the chart carefully and recognize, using context, where substitutions have occurred  *

## 2022-12-27 NOTE — LETTER
December 27, 2022     Patient: Alexis Mendes   YOB: 1985   Date of Visit: 12/27/2022       To Whom It May Concern: It is my medical opinion that Alexis Mendes may return to work on 12/28/2022       If you have any questions or concerns, please don't hesitate to call           Sincerely,        Yamel Gaffney PA-C    CC: No Recipients

## 2022-12-27 NOTE — PATIENT INSTRUCTIONS
Take Augmentin as prescribed  If symptoms do not improve in 2-3 days follow-up with PCP or return for chest x-ray  If symptoms worsen or you develop any chest pain or shortness of breath, report to the emergency department

## 2023-05-16 ENCOUNTER — OFFICE VISIT (OUTPATIENT)
Dept: FAMILY MEDICINE CLINIC | Facility: CLINIC | Age: 38
End: 2023-05-16

## 2023-05-16 VITALS
BODY MASS INDEX: 28.85 KG/M2 | HEIGHT: 75 IN | HEART RATE: 91 BPM | OXYGEN SATURATION: 96 % | RESPIRATION RATE: 16 BRPM | DIASTOLIC BLOOD PRESSURE: 76 MMHG | WEIGHT: 232 LBS | SYSTOLIC BLOOD PRESSURE: 120 MMHG | TEMPERATURE: 97.8 F

## 2023-05-16 DIAGNOSIS — J20.9 ACUTE BRONCHITIS, UNSPECIFIED ORGANISM: Primary | ICD-10-CM

## 2023-05-16 DIAGNOSIS — R05.1 ACUTE COUGH: ICD-10-CM

## 2023-05-16 DIAGNOSIS — Z16.20 THERAPY FAILURE DUE TO ANTIBIOTIC RESISTANCE: ICD-10-CM

## 2023-05-16 RX ORDER — AMOXICILLIN AND CLAVULANATE POTASSIUM 875; 125 MG/1; MG/1
1 TABLET, FILM COATED ORAL EVERY 12 HOURS SCHEDULED
Qty: 14 TABLET | Refills: 0 | Status: SHIPPED | OUTPATIENT
Start: 2023-05-16 | End: 2023-05-23

## 2023-05-16 NOTE — PROGRESS NOTES
Outpatient Note- Follow up     HPI:     Jackson Boothe , 44 y o  male  presents today for URI symptoms  The symptoms started 4-6 weeks ago  The patient was using Q tips and cause some irritation  This resulted in infection to the ear, which traveled into the sinuses and throat  He had a similar episode last year that was somewhat treated with Z pack and then required augmentin  He recently had a Z pack 2 weeks ago and although his symptoms improved he has had a return of the cough, mucus production, fatigue, sore throat, and hoarseness  He denies fever, chills, diarrhea, constipation, abdominal pain, lightheadedness, dizziness,  Rhinorrhea, nasal congestion, sinus pressure, sinus pain, pain in ears  Past Medical History:   Diagnosis Date   • GERD (gastroesophageal reflux disease)       ROS:   Review of Systems   See HPI    OBJECTIVE  Vitals:    05/16/23 1750   BP: 120/76   Pulse: 91   Resp: 16   Temp: 97 8 °F (36 6 °C)   SpO2: 96%        Physical Exam  Constitutional:       General: He is not in acute distress  Appearance: Normal appearance  He is not toxic-appearing or diaphoretic  HENT:      Head: Normocephalic and atraumatic  Right Ear: Tympanic membrane, ear canal and external ear normal       Left Ear: Tympanic membrane, ear canal and external ear normal       Ears:      Comments: Mild irritation of the ear canals bilaterally  No evidence of otitis externa or infection behind Tm  Nose: No congestion or rhinorrhea  Mouth/Throat:      Mouth: Mucous membranes are moist       Pharynx: Oropharynx is clear  Posterior oropharyngeal erythema (mild ertyhema and increased vascularity) present  No oropharyngeal exudate  Eyes:      General:         Right eye: No discharge  Left eye: No discharge  Pupils: Pupils are equal, round, and reactive to light  Cardiovascular:      Rate and Rhythm: Normal rate and regular rhythm  Heart sounds: Normal heart sounds   No murmur heard     No friction rub  No gallop  Pulmonary:      Effort: No respiratory distress  Breath sounds: No stridor  Rhonchi (intermittent in upper lung fields and right middle lobe  ) present  No wheezing or rales  Musculoskeletal:      Cervical back: No tenderness  Lymphadenopathy:      Cervical: Cervical adenopathy ( several shotty nodes in the neck, mildly enlarged) present  Neurological:      Mental Status: He is alert  ASSESSMENT AND PLAN   Abisai Vick was seen today for cough  Diagnoses and all orders for this visit:    Acute bronchitis, unspecified organism  Therapy failure due to antibiotic resistance  Acute cough  patient presents with initial treatment with azithromycin  Bacteria likely resistant to azithromycin since there was mild improvement and then his symptoms exacerbated  The patient has required Augmentin in the past to treat the acute bronchitis or possibly even early bacterial PNA with rhonchi in multiple lung fields  Will treat empirically since he has had improvement in the past  He is to drink plenty of fluids and monitor symptoms  If not improving then I recommend patient return  -     amoxicillin-clavulanate (AUGMENTIN) 875-125 mg per tablet;  Take 1 tablet by mouth every 12 (twelve) hours for 7 days       Elias Bella DO  [facility]  5/16/2023 6:13 PM